# Patient Record
Sex: MALE | Race: WHITE | NOT HISPANIC OR LATINO | Employment: OTHER | URBAN - METROPOLITAN AREA
[De-identification: names, ages, dates, MRNs, and addresses within clinical notes are randomized per-mention and may not be internally consistent; named-entity substitution may affect disease eponyms.]

---

## 2018-03-19 NOTE — PATIENT DISCUSSION
CHARLES OU:  PRESCRIBED UV PROTECTION TO SLOW GROWTH. PRESCRIBE ARTIFICAL TEARS TO INCREASE COMFORT.

## 2019-12-16 ENCOUNTER — FOLLOW UP (OUTPATIENT)
Dept: URBAN - METROPOLITAN AREA CLINIC 26 | Facility: CLINIC | Age: 79
End: 2019-12-16

## 2019-12-16 VITALS
SYSTOLIC BLOOD PRESSURE: 131 MMHG | WEIGHT: 155 LBS | HEART RATE: 60 BPM | DIASTOLIC BLOOD PRESSURE: 61 MMHG | HEIGHT: 65 IN | BODY MASS INDEX: 25.83 KG/M2

## 2019-12-16 DIAGNOSIS — H35.3112: ICD-10-CM

## 2019-12-16 DIAGNOSIS — H02.833: ICD-10-CM

## 2019-12-16 DIAGNOSIS — H02.836: ICD-10-CM

## 2019-12-16 DIAGNOSIS — H04.123: ICD-10-CM

## 2019-12-16 DIAGNOSIS — H25.13: ICD-10-CM

## 2019-12-16 PROCEDURE — 92134 CPTRZ OPH DX IMG PST SGM RTA: CPT

## 2019-12-16 PROCEDURE — 92014 COMPRE OPH EXAM EST PT 1/>: CPT

## 2019-12-16 PROCEDURE — 92250 FUNDUS PHOTOGRAPHY W/I&R: CPT

## 2019-12-16 ASSESSMENT — VISUAL ACUITY
OS_CC: 20/20+2
OD_SC: 20/50+1
OS_SC: 20/70+1
OD_PH: 20/30+2
OS_PH: 20/25+1
OD_CC: 20/20-1

## 2019-12-16 ASSESSMENT — TONOMETRY
OS_IOP_MMHG: 15
OD_IOP_MMHG: 18

## 2021-01-12 ENCOUNTER — FOLLOW UP (OUTPATIENT)
Dept: URBAN - METROPOLITAN AREA CLINIC 26 | Facility: CLINIC | Age: 81
End: 2021-01-12

## 2021-01-12 DIAGNOSIS — H35.3112: ICD-10-CM

## 2021-01-12 DIAGNOSIS — H02.833: ICD-10-CM

## 2021-01-12 DIAGNOSIS — H43.811: ICD-10-CM

## 2021-01-12 DIAGNOSIS — H25.13: ICD-10-CM

## 2021-01-12 DIAGNOSIS — H04.123: ICD-10-CM

## 2021-01-12 DIAGNOSIS — H02.836: ICD-10-CM

## 2021-01-12 PROCEDURE — 92134 CPTRZ OPH DX IMG PST SGM RTA: CPT

## 2021-01-12 PROCEDURE — 92250 FUNDUS PHOTOGRAPHY W/I&R: CPT

## 2021-01-12 PROCEDURE — 92014 COMPRE OPH EXAM EST PT 1/>: CPT

## 2021-01-12 ASSESSMENT — TONOMETRY
OS_IOP_MMHG: 18
OD_IOP_MMHG: 17

## 2021-01-12 ASSESSMENT — VISUAL ACUITY
OS_CC: 20/20-2
OD_PH: 20/20-2
OD_CC: 20/40-2

## 2021-11-03 ENCOUNTER — EST. PATIENT EMERGENCY (OUTPATIENT)
Dept: URBAN - METROPOLITAN AREA CLINIC 26 | Facility: CLINIC | Age: 81
End: 2021-11-03

## 2021-11-03 VITALS — HEIGHT: 65 IN | BODY MASS INDEX: 25.33 KG/M2 | WEIGHT: 152 LBS

## 2021-11-03 DIAGNOSIS — H04.123: ICD-10-CM

## 2021-11-03 DIAGNOSIS — H43.811: ICD-10-CM

## 2021-11-03 DIAGNOSIS — H35.3112: ICD-10-CM

## 2021-11-03 PROCEDURE — 92014 COMPRE OPH EXAM EST PT 1/>: CPT

## 2021-11-03 PROCEDURE — 92250 FUNDUS PHOTOGRAPHY W/I&R: CPT

## 2021-11-03 PROCEDURE — 92134 CPTRZ OPH DX IMG PST SGM RTA: CPT

## 2021-11-03 ASSESSMENT — TONOMETRY
OD_IOP_MMHG: 18
OS_IOP_MMHG: 18

## 2021-11-03 ASSESSMENT — VISUAL ACUITY
OD_CC: 20/40+2
OS_CC: 20/20
OD_PH: 20/30+2

## 2022-11-09 ENCOUNTER — COMPREHENSIVE EXAM (OUTPATIENT)
Dept: URBAN - METROPOLITAN AREA CLINIC 26 | Facility: CLINIC | Age: 82
End: 2022-11-09

## 2022-11-09 VITALS
HEIGHT: 65 IN | HEART RATE: 64 BPM | SYSTOLIC BLOOD PRESSURE: 148 MMHG | WEIGHT: 147 LBS | DIASTOLIC BLOOD PRESSURE: 71 MMHG | BODY MASS INDEX: 24.49 KG/M2

## 2022-11-09 DIAGNOSIS — H43.392: ICD-10-CM

## 2022-11-09 DIAGNOSIS — H43.811: ICD-10-CM

## 2022-11-09 DIAGNOSIS — H34.8322: ICD-10-CM

## 2022-11-09 DIAGNOSIS — H35.3112: ICD-10-CM

## 2022-11-09 DIAGNOSIS — H04.123: ICD-10-CM

## 2022-11-09 PROCEDURE — 92235 FLUORESCEIN ANGRPH MLTIFRAME: CPT

## 2022-11-09 PROCEDURE — 92134 CPTRZ OPH DX IMG PST SGM RTA: CPT

## 2022-11-09 PROCEDURE — 92014 COMPRE OPH EXAM EST PT 1/>: CPT

## 2022-11-09 PROCEDURE — 92250 FUNDUS PHOTOGRAPHY W/I&R: CPT

## 2022-11-09 ASSESSMENT — TONOMETRY
OS_IOP_MMHG: 16
OD_IOP_MMHG: 18

## 2022-11-09 ASSESSMENT — VISUAL ACUITY
OD_CC: 20/25-2
OS_CC: 20/20-2

## 2023-07-12 ENCOUNTER — EMERGENCY VISIT (OUTPATIENT)
Dept: URBAN - METROPOLITAN AREA CLINIC 33 | Facility: CLINIC | Age: 83
End: 2023-07-12

## 2023-07-12 DIAGNOSIS — H34.8322: ICD-10-CM

## 2023-07-12 DIAGNOSIS — H02.836: ICD-10-CM

## 2023-07-12 DIAGNOSIS — H43.392: ICD-10-CM

## 2023-07-12 DIAGNOSIS — H04.123: ICD-10-CM

## 2023-07-12 DIAGNOSIS — H25.13: ICD-10-CM

## 2023-07-12 DIAGNOSIS — H43.811: ICD-10-CM

## 2023-07-12 DIAGNOSIS — H02.833: ICD-10-CM

## 2023-07-12 DIAGNOSIS — H35.3112: ICD-10-CM

## 2023-07-12 DIAGNOSIS — H20.9: ICD-10-CM

## 2023-07-12 PROCEDURE — 92134 CPTRZ OPH DX IMG PST SGM RTA: CPT

## 2023-07-12 PROCEDURE — 92014 COMPRE OPH EXAM EST PT 1/>: CPT

## 2023-07-12 RX ORDER — PREDNISOLONE ACETATE 10 MG/ML: 1 SUSPENSION/ DROPS OPHTHALMIC

## 2023-07-12 ASSESSMENT — TONOMETRY
OD_IOP_MMHG: 16
OS_IOP_MMHG: 15

## 2023-07-12 ASSESSMENT — VISUAL ACUITY
OS_CC: 20/20
OD_CC: 20/25-2

## 2023-08-14 ENCOUNTER — FOLLOW UP (OUTPATIENT)
Dept: URBAN - METROPOLITAN AREA CLINIC 26 | Facility: CLINIC | Age: 83
End: 2023-08-14

## 2023-08-14 VITALS — BODY MASS INDEX: 25.55 KG/M2 | HEIGHT: 66 IN | WEIGHT: 159 LBS

## 2023-08-14 DIAGNOSIS — H35.3112: ICD-10-CM

## 2023-08-14 DIAGNOSIS — H04.123: ICD-10-CM

## 2023-08-14 DIAGNOSIS — H43.392: ICD-10-CM

## 2023-08-14 DIAGNOSIS — H20.9: ICD-10-CM

## 2023-08-14 DIAGNOSIS — H34.8322: ICD-10-CM

## 2023-08-14 DIAGNOSIS — H43.811: ICD-10-CM

## 2023-08-14 PROCEDURE — 99214 OFFICE O/P EST MOD 30 MIN: CPT

## 2023-08-14 PROCEDURE — 92250 FUNDUS PHOTOGRAPHY W/I&R: CPT

## 2023-08-14 PROCEDURE — 92134 CPTRZ OPH DX IMG PST SGM RTA: CPT

## 2023-08-14 ASSESSMENT — VISUAL ACUITY
OD_CC: 20/25+2
OS_CC: 20/20-2

## 2023-08-14 ASSESSMENT — TONOMETRY
OD_IOP_MMHG: 15
OS_IOP_MMHG: 15

## 2024-08-14 ENCOUNTER — FOLLOW UP (OUTPATIENT)
Dept: URBAN - METROPOLITAN AREA CLINIC 26 | Facility: CLINIC | Age: 84
End: 2024-08-14

## 2024-08-14 VITALS
BODY MASS INDEX: 23.3 KG/M2 | DIASTOLIC BLOOD PRESSURE: 96 MMHG | HEIGHT: 66 IN | WEIGHT: 145 LBS | SYSTOLIC BLOOD PRESSURE: 165 MMHG | HEART RATE: 64 BPM

## 2024-08-14 DIAGNOSIS — H35.3112: ICD-10-CM

## 2024-08-14 DIAGNOSIS — H20.9: ICD-10-CM

## 2024-08-14 DIAGNOSIS — H04.123: ICD-10-CM

## 2024-08-14 DIAGNOSIS — H34.8322: ICD-10-CM

## 2024-08-14 DIAGNOSIS — H43.811: ICD-10-CM

## 2024-08-14 DIAGNOSIS — H02.836: ICD-10-CM

## 2024-08-14 DIAGNOSIS — H25.13: ICD-10-CM

## 2024-08-14 DIAGNOSIS — H02.833: ICD-10-CM

## 2024-08-14 DIAGNOSIS — H43.392: ICD-10-CM

## 2024-08-14 PROCEDURE — 92235 FLUORESCEIN ANGRPH MLTIFRAME: CPT

## 2024-08-14 PROCEDURE — 92250 FUNDUS PHOTOGRAPHY W/I&R: CPT | Mod: 59

## 2024-08-14 PROCEDURE — 92134 CPTRZ OPH DX IMG PST SGM RTA: CPT

## 2024-08-14 PROCEDURE — 92014 COMPRE OPH EXAM EST PT 1/>: CPT

## 2024-08-14 ASSESSMENT — VISUAL ACUITY
OS_CC: 20/25-1
OD_CC: 20/30-1

## 2024-08-14 ASSESSMENT — TONOMETRY
OD_IOP_MMHG: 16
OS_IOP_MMHG: 17